# Patient Record
Sex: FEMALE | Race: WHITE | NOT HISPANIC OR LATINO | URBAN - NONMETROPOLITAN AREA
[De-identification: names, ages, dates, MRNs, and addresses within clinical notes are randomized per-mention and may not be internally consistent; named-entity substitution may affect disease eponyms.]

---

## 2018-02-02 ENCOUNTER — APPOINTMENT (RX ONLY)
Dept: URBAN - NONMETROPOLITAN AREA CLINIC 28 | Facility: CLINIC | Age: 28
Setting detail: DERMATOLOGY
End: 2018-02-02

## 2018-02-02 DIAGNOSIS — L91.8 OTHER HYPERTROPHIC DISORDERS OF THE SKIN: ICD-10-CM

## 2018-02-02 PROBLEM — D48.5 NEOPLASM OF UNCERTAIN BEHAVIOR OF SKIN: Status: ACTIVE | Noted: 2018-02-02

## 2018-02-02 PROBLEM — I10 ESSENTIAL (PRIMARY) HYPERTENSION: Status: ACTIVE | Noted: 2018-02-02

## 2018-02-02 PROCEDURE — 11200 RMVL SKIN TAGS UP TO&INC 15: CPT

## 2018-02-02 PROCEDURE — ? SKIN TAG REMOVAL WITH PATHOLOGY

## 2018-02-02 PROCEDURE — ? COUNSELING

## 2018-02-02 ASSESSMENT — LOCATION ZONE DERM: LOCATION ZONE: FACE

## 2018-02-02 ASSESSMENT — LOCATION DETAILED DESCRIPTION DERM: LOCATION DETAILED: RIGHT SUPERIOR CENTRAL MALAR CHEEK

## 2018-02-02 ASSESSMENT — LOCATION SIMPLE DESCRIPTION DERM: LOCATION SIMPLE: RIGHT CHEEK

## 2018-02-02 NOTE — PROCEDURE: SKIN TAG REMOVAL WITH PATHOLOGY
Post-Care Instructions: I reviewed with the patient in detail post-care instructions. Patient is to keep the site dry overnight, and then apply bacitracin twice daily until healed.
Medical Necessity Clause: This procedure was medically necessary because the lesions that were treated were:
Notification Instructions: Patient will be notified of biopsy results. However, patient instructed to call the office if not contacted within 2 weeks.
Total Number Of Lesions Treated: 1
Anesthesia Volume In Cc: 0.5
Consent: Written consent was obtained and risks were reviewed including but not limited to scarring, infection, bleeding, scabbing, incomplete removal, nerve damage and allergy to anesthesia.
Medical Necessity Information: It is in your best interest to select a reason for this procedure from the list below. All of these items fulfill various CMS LCD requirements except the new and changing color options.
Hemostasis: Drysol
Skin Tags Removed With: scissors
Additional Anesthesia Volume In Cc: 6
Render Post-Care Instructions In Note?: yes
Biopsy Type: H and E
Include Z78.9 (Other Specified Conditions Influencing Health Status) As An Associated Diagnosis?: No
Lab Facility: [id number]
Billing Type: Third-Party Bill
Post Procedure Care: Following the procedure hemostasis was acheived with drysol and bandages.
Anesthesia Type: 2% Xylocaine with 1:100,000 epinephrine
Lab: [id number]
Detail Level: Detailed